# Patient Record
Sex: FEMALE | Race: WHITE | NOT HISPANIC OR LATINO | URBAN - METROPOLITAN AREA
[De-identification: names, ages, dates, MRNs, and addresses within clinical notes are randomized per-mention and may not be internally consistent; named-entity substitution may affect disease eponyms.]

---

## 2017-01-06 ENCOUNTER — COMMUNICATION - HEALTHEAST (OUTPATIENT)
Dept: FAMILY MEDICINE | Facility: CLINIC | Age: 47
End: 2017-01-06

## 2017-01-06 DIAGNOSIS — F31.81 BIPOLAR 2 DISORDER (H): ICD-10-CM

## 2017-01-09 ENCOUNTER — COMMUNICATION - HEALTHEAST (OUTPATIENT)
Dept: FAMILY MEDICINE | Facility: CLINIC | Age: 47
End: 2017-01-09

## 2017-01-10 ENCOUNTER — AMBULATORY - HEALTHEAST (OUTPATIENT)
Dept: LAB | Facility: CLINIC | Age: 47
End: 2017-01-10

## 2017-01-10 ENCOUNTER — COMMUNICATION - HEALTHEAST (OUTPATIENT)
Dept: FAMILY MEDICINE | Facility: CLINIC | Age: 47
End: 2017-01-10

## 2017-01-10 ENCOUNTER — COMMUNICATION - HEALTHEAST (OUTPATIENT)
Dept: NURSING | Facility: CLINIC | Age: 47
End: 2017-01-10

## 2017-01-10 DIAGNOSIS — D68.51 FACTOR V LEIDEN MUTATION (H): ICD-10-CM

## 2017-01-11 ENCOUNTER — COMMUNICATION - HEALTHEAST (OUTPATIENT)
Dept: FAMILY MEDICINE | Facility: CLINIC | Age: 47
End: 2017-01-11

## 2017-01-12 ENCOUNTER — COMMUNICATION - HEALTHEAST (OUTPATIENT)
Dept: FAMILY MEDICINE | Facility: CLINIC | Age: 47
End: 2017-01-12

## 2017-01-12 DIAGNOSIS — D68.51 FACTOR V LEIDEN MUTATION (H): ICD-10-CM

## 2017-01-28 ENCOUNTER — COMMUNICATION - HEALTHEAST (OUTPATIENT)
Dept: FAMILY MEDICINE | Facility: CLINIC | Age: 47
End: 2017-01-28

## 2017-01-28 DIAGNOSIS — F41.0 PANIC ATTACK: ICD-10-CM

## 2017-01-28 DIAGNOSIS — F41.1 GAD (GENERALIZED ANXIETY DISORDER): ICD-10-CM

## 2017-01-30 ENCOUNTER — RECORDS - HEALTHEAST (OUTPATIENT)
Dept: GENERAL RADIOLOGY | Facility: CLINIC | Age: 47
End: 2017-01-30

## 2017-01-30 ENCOUNTER — OFFICE VISIT - HEALTHEAST (OUTPATIENT)
Dept: FAMILY MEDICINE | Facility: CLINIC | Age: 47
End: 2017-01-30

## 2017-01-30 ENCOUNTER — COMMUNICATION - HEALTHEAST (OUTPATIENT)
Dept: INTERNAL MEDICINE | Facility: CLINIC | Age: 47
End: 2017-01-30

## 2017-01-30 DIAGNOSIS — M54.50 LOW BACK PAIN: ICD-10-CM

## 2017-01-30 DIAGNOSIS — E78.5 HYPERLIPIDEMIA: ICD-10-CM

## 2017-01-30 DIAGNOSIS — M53.3 SACROCOCCYGEAL DISORDERS, NOT ELSEWHERE CLASSIFIED: ICD-10-CM

## 2017-01-30 DIAGNOSIS — D68.51 FACTOR V LEIDEN MUTATION (H): ICD-10-CM

## 2017-01-30 DIAGNOSIS — Z72.0 NICOTINE ABUSE: ICD-10-CM

## 2017-01-30 DIAGNOSIS — F41.1 GAD (GENERALIZED ANXIETY DISORDER): ICD-10-CM

## 2017-01-30 DIAGNOSIS — F31.81 BIPOLAR 2 DISORDER (H): ICD-10-CM

## 2017-01-30 DIAGNOSIS — R53.83 FATIGUE: ICD-10-CM

## 2017-01-30 DIAGNOSIS — F41.0 PANIC ATTACK: ICD-10-CM

## 2017-01-30 DIAGNOSIS — M53.3 COCCYXDYNIA: ICD-10-CM

## 2017-01-31 LAB
CHOLEST SERPL-MCNC: 221 MG/DL
FASTING STATUS PATIENT QL REPORTED: NO
HDLC SERPL-MCNC: 38 MG/DL
LDLC SERPL CALC-MCNC: 158 MG/DL
TRIGL SERPL-MCNC: 123 MG/DL

## 2017-02-02 ENCOUNTER — COMMUNICATION - HEALTHEAST (OUTPATIENT)
Dept: FAMILY MEDICINE | Facility: CLINIC | Age: 47
End: 2017-02-02

## 2017-02-02 ENCOUNTER — AMBULATORY - HEALTHEAST (OUTPATIENT)
Dept: NURSING | Facility: CLINIC | Age: 47
End: 2017-02-02

## 2017-02-02 DIAGNOSIS — D68.51 FACTOR V LEIDEN MUTATION (H): ICD-10-CM

## 2017-02-02 DIAGNOSIS — Z79.01 LONG TERM (CURRENT) USE OF ANTICOAGULANTS: ICD-10-CM

## 2017-02-02 DIAGNOSIS — Z79.899 CONTROLLED SUBSTANCE AGREEMENT SIGNED: ICD-10-CM

## 2017-02-06 ENCOUNTER — COMMUNICATION - HEALTHEAST (OUTPATIENT)
Dept: FAMILY MEDICINE | Facility: CLINIC | Age: 47
End: 2017-02-06

## 2017-02-13 ENCOUNTER — OFFICE VISIT - HEALTHEAST (OUTPATIENT)
Dept: FAMILY MEDICINE | Facility: CLINIC | Age: 47
End: 2017-02-13

## 2017-02-13 DIAGNOSIS — B07.0 PLANTAR WART: ICD-10-CM

## 2017-02-13 DIAGNOSIS — B35.3 TINEA PEDIS: ICD-10-CM

## 2017-02-13 ASSESSMENT — MIFFLIN-ST. JEOR: SCORE: 1353.43

## 2017-02-20 ENCOUNTER — COMMUNICATION - HEALTHEAST (OUTPATIENT)
Dept: SCHEDULING | Facility: CLINIC | Age: 47
End: 2017-02-20

## 2017-02-20 DIAGNOSIS — F17.200 NICOTINE DEPENDENCE: ICD-10-CM

## 2017-02-21 ENCOUNTER — COMMUNICATION - HEALTHEAST (OUTPATIENT)
Dept: FAMILY MEDICINE | Facility: CLINIC | Age: 47
End: 2017-02-21

## 2017-02-21 DIAGNOSIS — F41.1 GAD (GENERALIZED ANXIETY DISORDER): ICD-10-CM

## 2017-02-21 DIAGNOSIS — G43.909 MIGRAINE: ICD-10-CM

## 2017-02-25 ENCOUNTER — COMMUNICATION - HEALTHEAST (OUTPATIENT)
Dept: FAMILY MEDICINE | Facility: CLINIC | Age: 47
End: 2017-02-25

## 2017-02-25 DIAGNOSIS — F41.1 GAD (GENERALIZED ANXIETY DISORDER): ICD-10-CM

## 2017-02-25 DIAGNOSIS — F41.0 PANIC ATTACK: ICD-10-CM

## 2017-03-16 ENCOUNTER — COMMUNICATION - HEALTHEAST (OUTPATIENT)
Dept: NURSING | Facility: CLINIC | Age: 47
End: 2017-03-16

## 2017-03-16 ENCOUNTER — AMBULATORY - HEALTHEAST (OUTPATIENT)
Dept: LAB | Facility: CLINIC | Age: 47
End: 2017-03-16

## 2017-03-16 DIAGNOSIS — D68.51 FACTOR V LEIDEN MUTATION (H): ICD-10-CM

## 2017-03-24 ENCOUNTER — COMMUNICATION - HEALTHEAST (OUTPATIENT)
Dept: FAMILY MEDICINE | Facility: CLINIC | Age: 47
End: 2017-03-24

## 2017-03-24 DIAGNOSIS — F41.1 GAD (GENERALIZED ANXIETY DISORDER): ICD-10-CM

## 2017-03-24 DIAGNOSIS — F41.0 PANIC ATTACK: ICD-10-CM

## 2017-03-29 ENCOUNTER — COMMUNICATION - HEALTHEAST (OUTPATIENT)
Dept: INTERNAL MEDICINE | Facility: CLINIC | Age: 47
End: 2017-03-29

## 2017-03-29 ENCOUNTER — AMBULATORY - HEALTHEAST (OUTPATIENT)
Dept: LAB | Facility: CLINIC | Age: 47
End: 2017-03-29

## 2017-03-29 DIAGNOSIS — D68.51 FACTOR V LEIDEN MUTATION (H): ICD-10-CM

## 2017-04-12 ENCOUNTER — COMMUNICATION - HEALTHEAST (OUTPATIENT)
Dept: INTERNAL MEDICINE | Facility: CLINIC | Age: 47
End: 2017-04-12

## 2017-04-12 DIAGNOSIS — D68.51 FACTOR V LEIDEN MUTATION (H): ICD-10-CM

## 2017-04-17 ENCOUNTER — AMBULATORY - HEALTHEAST (OUTPATIENT)
Dept: LAB | Facility: CLINIC | Age: 47
End: 2017-04-17

## 2017-04-17 ENCOUNTER — COMMUNICATION - HEALTHEAST (OUTPATIENT)
Dept: INTERNAL MEDICINE | Facility: CLINIC | Age: 47
End: 2017-04-17

## 2017-04-17 DIAGNOSIS — D68.51 FACTOR V LEIDEN MUTATION (H): ICD-10-CM

## 2017-04-19 ENCOUNTER — COMMUNICATION - HEALTHEAST (OUTPATIENT)
Dept: FAMILY MEDICINE | Facility: CLINIC | Age: 47
End: 2017-04-19

## 2017-04-20 ENCOUNTER — COMMUNICATION - HEALTHEAST (OUTPATIENT)
Dept: FAMILY MEDICINE | Facility: CLINIC | Age: 47
End: 2017-04-20

## 2017-04-20 DIAGNOSIS — F41.0 PANIC ATTACK: ICD-10-CM

## 2017-04-20 DIAGNOSIS — F41.1 GAD (GENERALIZED ANXIETY DISORDER): ICD-10-CM

## 2017-05-02 ENCOUNTER — COMMUNICATION - HEALTHEAST (OUTPATIENT)
Dept: SCHEDULING | Facility: CLINIC | Age: 47
End: 2017-05-02

## 2017-05-03 ENCOUNTER — COMMUNICATION - HEALTHEAST (OUTPATIENT)
Dept: FAMILY MEDICINE | Facility: CLINIC | Age: 47
End: 2017-05-03

## 2017-05-03 ENCOUNTER — COMMUNICATION - HEALTHEAST (OUTPATIENT)
Dept: NURSING | Facility: CLINIC | Age: 47
End: 2017-05-03

## 2017-05-03 DIAGNOSIS — D68.51 FACTOR V LEIDEN MUTATION (H): ICD-10-CM

## 2017-05-03 DIAGNOSIS — F17.200 NICOTINE DEPENDENCE: ICD-10-CM

## 2017-05-03 DIAGNOSIS — F41.1 GAD (GENERALIZED ANXIETY DISORDER): ICD-10-CM

## 2017-05-03 DIAGNOSIS — F31.81 BIPOLAR 2 DISORDER (H): ICD-10-CM

## 2017-05-03 DIAGNOSIS — Z79.01 LONG TERM (CURRENT) USE OF ANTICOAGULANTS: ICD-10-CM

## 2017-05-04 ENCOUNTER — COMMUNICATION - HEALTHEAST (OUTPATIENT)
Dept: INTERNAL MEDICINE | Facility: CLINIC | Age: 47
End: 2017-05-04

## 2017-05-04 ENCOUNTER — AMBULATORY - HEALTHEAST (OUTPATIENT)
Dept: LAB | Facility: CLINIC | Age: 47
End: 2017-05-04

## 2017-05-04 DIAGNOSIS — D68.51 FACTOR V LEIDEN MUTATION (H): ICD-10-CM

## 2017-05-05 ENCOUNTER — COMMUNICATION - HEALTHEAST (OUTPATIENT)
Dept: FAMILY MEDICINE | Facility: CLINIC | Age: 47
End: 2017-05-05

## 2017-05-05 DIAGNOSIS — F31.81 BIPOLAR 2 DISORDER (H): ICD-10-CM

## 2017-05-05 DIAGNOSIS — F17.200 NICOTINE DEPENDENCE: ICD-10-CM

## 2017-05-05 DIAGNOSIS — D68.51 FACTOR V LEIDEN MUTATION (H): ICD-10-CM

## 2017-05-05 DIAGNOSIS — Z79.01 LONG TERM (CURRENT) USE OF ANTICOAGULANTS: ICD-10-CM

## 2017-05-08 ENCOUNTER — RECORDS - HEALTHEAST (OUTPATIENT)
Dept: ADMINISTRATIVE | Facility: OTHER | Age: 47
End: 2017-05-08

## 2017-05-08 ENCOUNTER — COMMUNICATION - HEALTHEAST (OUTPATIENT)
Dept: FAMILY MEDICINE | Facility: CLINIC | Age: 47
End: 2017-05-08

## 2017-05-08 DIAGNOSIS — Z79.01 LONG TERM (CURRENT) USE OF ANTICOAGULANTS: ICD-10-CM

## 2017-05-08 DIAGNOSIS — D68.51 FACTOR V LEIDEN MUTATION (H): ICD-10-CM

## 2017-05-09 ENCOUNTER — AMBULATORY - HEALTHEAST (OUTPATIENT)
Dept: LAB | Facility: CLINIC | Age: 47
End: 2017-05-09

## 2017-05-09 ENCOUNTER — COMMUNICATION - HEALTHEAST (OUTPATIENT)
Dept: INTERNAL MEDICINE | Facility: CLINIC | Age: 47
End: 2017-05-09

## 2017-05-09 ENCOUNTER — COMMUNICATION - HEALTHEAST (OUTPATIENT)
Dept: FAMILY MEDICINE | Facility: CLINIC | Age: 47
End: 2017-05-09

## 2017-05-09 DIAGNOSIS — D68.51 FACTOR V LEIDEN MUTATION (H): ICD-10-CM

## 2017-05-09 DIAGNOSIS — Z79.01 LONG TERM (CURRENT) USE OF ANTICOAGULANTS: ICD-10-CM

## 2017-05-11 ENCOUNTER — RECORDS - HEALTHEAST (OUTPATIENT)
Dept: ADMINISTRATIVE | Facility: OTHER | Age: 47
End: 2017-05-11

## 2017-05-19 ENCOUNTER — AMBULATORY - HEALTHEAST (OUTPATIENT)
Dept: LAB | Facility: CLINIC | Age: 47
End: 2017-05-19

## 2017-05-19 ENCOUNTER — COMMUNICATION - HEALTHEAST (OUTPATIENT)
Dept: FAMILY MEDICINE | Facility: CLINIC | Age: 47
End: 2017-05-19

## 2017-05-19 ENCOUNTER — COMMUNICATION - HEALTHEAST (OUTPATIENT)
Dept: INTERNAL MEDICINE | Facility: CLINIC | Age: 47
End: 2017-05-19

## 2017-05-19 DIAGNOSIS — D68.51 FACTOR V LEIDEN MUTATION (H): ICD-10-CM

## 2017-05-21 ENCOUNTER — COMMUNICATION - HEALTHEAST (OUTPATIENT)
Dept: FAMILY MEDICINE | Facility: CLINIC | Age: 47
End: 2017-05-21

## 2017-05-21 DIAGNOSIS — F41.1 GAD (GENERALIZED ANXIETY DISORDER): ICD-10-CM

## 2017-05-21 DIAGNOSIS — F41.0 PANIC ATTACK: ICD-10-CM

## 2017-05-23 ENCOUNTER — COMMUNICATION - HEALTHEAST (OUTPATIENT)
Dept: INTERNAL MEDICINE | Facility: CLINIC | Age: 47
End: 2017-05-23

## 2017-05-23 ENCOUNTER — AMBULATORY - HEALTHEAST (OUTPATIENT)
Dept: LAB | Facility: CLINIC | Age: 47
End: 2017-05-23

## 2017-05-23 DIAGNOSIS — D68.51 FACTOR V LEIDEN MUTATION (H): ICD-10-CM

## 2017-05-27 ENCOUNTER — COMMUNICATION - HEALTHEAST (OUTPATIENT)
Dept: FAMILY MEDICINE | Facility: CLINIC | Age: 47
End: 2017-05-27

## 2017-05-27 DIAGNOSIS — F31.81 BIPOLAR 2 DISORDER (H): ICD-10-CM

## 2017-06-07 ENCOUNTER — OFFICE VISIT - HEALTHEAST (OUTPATIENT)
Dept: FAMILY MEDICINE | Facility: CLINIC | Age: 47
End: 2017-06-07

## 2017-06-07 DIAGNOSIS — J40 BRONCHITIS: ICD-10-CM

## 2017-06-07 ASSESSMENT — MIFFLIN-ST. JEOR: SCORE: 1321.68

## 2017-06-13 ENCOUNTER — COMMUNICATION - HEALTHEAST (OUTPATIENT)
Dept: INTERNAL MEDICINE | Facility: CLINIC | Age: 47
End: 2017-06-13

## 2017-06-13 ENCOUNTER — OFFICE VISIT - HEALTHEAST (OUTPATIENT)
Dept: FAMILY MEDICINE | Facility: CLINIC | Age: 47
End: 2017-06-13

## 2017-06-13 ENCOUNTER — RECORDS - HEALTHEAST (OUTPATIENT)
Dept: ADMINISTRATIVE | Facility: OTHER | Age: 47
End: 2017-06-13

## 2017-06-13 DIAGNOSIS — F32.9 MAJOR DEPRESSION, CHRONIC: ICD-10-CM

## 2017-06-13 DIAGNOSIS — K02.9 TOOTH DECAY: ICD-10-CM

## 2017-06-13 DIAGNOSIS — D68.51 FACTOR V LEIDEN MUTATION (H): ICD-10-CM

## 2017-06-13 DIAGNOSIS — Z00.00 ANNUAL PHYSICAL EXAM: ICD-10-CM

## 2017-06-13 DIAGNOSIS — F31.81 BIPOLAR 2 DISORDER (H): ICD-10-CM

## 2017-06-13 DIAGNOSIS — E78.5 HYPERLIPIDEMIA, UNSPECIFIED HYPERLIPIDEMIA TYPE: ICD-10-CM

## 2017-06-13 DIAGNOSIS — Z79.01 ANTICOAGULANT LONG-TERM USE: ICD-10-CM

## 2017-06-13 DIAGNOSIS — E55.9 VITAMIN D DEFICIENCY: ICD-10-CM

## 2017-06-13 LAB
CHOLEST SERPL-MCNC: 148 MG/DL
FASTING STATUS PATIENT QL REPORTED: ABNORMAL
HDLC SERPL-MCNC: 40 MG/DL
LDLC SERPL CALC-MCNC: 93 MG/DL
TRIGL SERPL-MCNC: 73 MG/DL

## 2017-06-13 ASSESSMENT — MIFFLIN-ST. JEOR: SCORE: 1328.49

## 2017-06-15 ENCOUNTER — COMMUNICATION - HEALTHEAST (OUTPATIENT)
Dept: FAMILY MEDICINE | Facility: CLINIC | Age: 47
End: 2017-06-15

## 2017-06-19 ENCOUNTER — COMMUNICATION - HEALTHEAST (OUTPATIENT)
Dept: FAMILY MEDICINE | Facility: CLINIC | Age: 47
End: 2017-06-19

## 2017-06-20 ENCOUNTER — COMMUNICATION - HEALTHEAST (OUTPATIENT)
Dept: FAMILY MEDICINE | Facility: CLINIC | Age: 47
End: 2017-06-20

## 2017-06-20 DIAGNOSIS — D68.51 FACTOR V LEIDEN MUTATION (H): ICD-10-CM

## 2017-06-20 DIAGNOSIS — F41.0 PANIC ATTACK: ICD-10-CM

## 2017-06-20 DIAGNOSIS — F41.1 GAD (GENERALIZED ANXIETY DISORDER): ICD-10-CM

## 2017-06-22 ENCOUNTER — AMBULATORY - HEALTHEAST (OUTPATIENT)
Dept: LAB | Facility: CLINIC | Age: 47
End: 2017-06-22

## 2017-06-22 ENCOUNTER — COMMUNICATION - HEALTHEAST (OUTPATIENT)
Dept: INTERNAL MEDICINE | Facility: CLINIC | Age: 47
End: 2017-06-22

## 2017-06-22 DIAGNOSIS — D68.51 FACTOR V LEIDEN MUTATION (H): ICD-10-CM

## 2017-06-24 ENCOUNTER — COMMUNICATION - HEALTHEAST (OUTPATIENT)
Dept: FAMILY MEDICINE | Facility: CLINIC | Age: 47
End: 2017-06-24

## 2017-06-24 DIAGNOSIS — G43.909 MIGRAINE: ICD-10-CM

## 2017-06-24 DIAGNOSIS — F41.1 GAD (GENERALIZED ANXIETY DISORDER): ICD-10-CM

## 2017-06-26 ENCOUNTER — COMMUNICATION - HEALTHEAST (OUTPATIENT)
Dept: FAMILY MEDICINE | Facility: CLINIC | Age: 47
End: 2017-06-26

## 2017-06-26 DIAGNOSIS — Z79.01 LONG TERM (CURRENT) USE OF ANTICOAGULANTS: ICD-10-CM

## 2017-06-26 DIAGNOSIS — D68.51 FACTOR V LEIDEN MUTATION (H): ICD-10-CM

## 2017-06-27 ENCOUNTER — OFFICE VISIT - HEALTHEAST (OUTPATIENT)
Dept: FAMILY MEDICINE | Facility: CLINIC | Age: 47
End: 2017-06-27

## 2017-06-27 ENCOUNTER — COMMUNICATION - HEALTHEAST (OUTPATIENT)
Dept: NURSING | Facility: CLINIC | Age: 47
End: 2017-06-27

## 2017-06-27 DIAGNOSIS — Z79.01 ANTICOAGULANT LONG-TERM USE: ICD-10-CM

## 2017-06-27 DIAGNOSIS — D68.51 FACTOR V LEIDEN MUTATION (H): ICD-10-CM

## 2017-06-27 DIAGNOSIS — R05.9 COUGH: ICD-10-CM

## 2017-06-27 DIAGNOSIS — B35.3 ATHLETE'S FOOT: ICD-10-CM

## 2017-06-27 DIAGNOSIS — J20.8 VIRAL BRONCHITIS: ICD-10-CM

## 2017-06-30 ENCOUNTER — COMMUNICATION - HEALTHEAST (OUTPATIENT)
Dept: INTERNAL MEDICINE | Facility: CLINIC | Age: 47
End: 2017-06-30

## 2017-06-30 ENCOUNTER — AMBULATORY - HEALTHEAST (OUTPATIENT)
Dept: LAB | Facility: CLINIC | Age: 47
End: 2017-06-30

## 2017-06-30 DIAGNOSIS — D68.51 FACTOR V LEIDEN MUTATION (H): ICD-10-CM

## 2017-07-17 ENCOUNTER — COMMUNICATION - HEALTHEAST (OUTPATIENT)
Dept: FAMILY MEDICINE | Facility: CLINIC | Age: 47
End: 2017-07-17

## 2017-07-17 DIAGNOSIS — F41.0 PANIC ATTACK: ICD-10-CM

## 2017-07-17 DIAGNOSIS — F41.1 GAD (GENERALIZED ANXIETY DISORDER): ICD-10-CM

## 2017-07-21 ENCOUNTER — COMMUNICATION - HEALTHEAST (OUTPATIENT)
Dept: INTERNAL MEDICINE | Facility: CLINIC | Age: 47
End: 2017-07-21

## 2017-07-21 DIAGNOSIS — D68.51 FACTOR V LEIDEN MUTATION (H): ICD-10-CM

## 2017-08-22 ENCOUNTER — COMMUNICATION - HEALTHEAST (OUTPATIENT)
Dept: FAMILY MEDICINE | Facility: CLINIC | Age: 47
End: 2017-08-22

## 2017-08-22 DIAGNOSIS — F31.81 BIPOLAR 2 DISORDER (H): ICD-10-CM

## 2017-09-21 ENCOUNTER — COMMUNICATION - HEALTHEAST (OUTPATIENT)
Dept: FAMILY MEDICINE | Facility: CLINIC | Age: 47
End: 2017-09-21

## 2018-09-17 ENCOUNTER — COMMUNICATION - HEALTHEAST (OUTPATIENT)
Dept: FAMILY MEDICINE | Facility: CLINIC | Age: 48
End: 2018-09-17

## 2018-12-03 ENCOUNTER — COMMUNICATION - HEALTHEAST (OUTPATIENT)
Dept: FAMILY MEDICINE | Facility: CLINIC | Age: 48
End: 2018-12-03

## 2018-12-03 DIAGNOSIS — F41.1 GAD (GENERALIZED ANXIETY DISORDER): ICD-10-CM

## 2018-12-03 DIAGNOSIS — G43.909 MIGRAINE: ICD-10-CM

## 2019-05-24 ENCOUNTER — COMMUNICATION - HEALTHEAST (OUTPATIENT)
Dept: FAMILY MEDICINE | Facility: CLINIC | Age: 49
End: 2019-05-24

## 2019-05-24 DIAGNOSIS — F41.1 GAD (GENERALIZED ANXIETY DISORDER): ICD-10-CM

## 2019-05-24 DIAGNOSIS — G43.909 MIGRAINE: ICD-10-CM

## 2021-05-28 ENCOUNTER — RECORDS - HEALTHEAST (OUTPATIENT)
Dept: ADMINISTRATIVE | Facility: CLINIC | Age: 51
End: 2021-05-28

## 2021-05-29 NOTE — TELEPHONE ENCOUNTER
lvmtcb - no refill until seen. Does she see Dr. Larson or another provider, please clarify. If she is still wanting to see Dr. Larson we need a med check appt  Please hlep her make an appt.

## 2021-05-29 NOTE — TELEPHONE ENCOUNTER
RN cannot approve Refill Request    RN can NOT refill this medication PCP messaged that patient is overdue for Office Visit, and BP. Last office visit: 2/13/2017 Benjy Bobby MD Last Physical: Visit date not found Last MTM visit: Visit date not found Last visit same specialty: 6/27/2017 Fannie Larson MD.  Next visit within 3 mo: Visit date not found  Next physical within 3 mo: Visit date not found      Ivan Mendoza, [facility] Triage/Med Refill 5/25/2019    Requested Prescriptions   Pending Prescriptions Disp Refills     propranolol (INDERAL) 10 MG tablet [Pharmacy Med Name: PROPRANOLOL 10MG TABLETS] 270 tablet 0     Sig: TAKE 1 TABLET(10 MG) BY MOUTH THREE TIMES DAILY FOR MIGRAINE OR ANXIETY PREVENTION       Beta-Blockers Refill Protocol Failed - 5/24/2019  2:18 PM        Failed - PCP or prescribing provider visit in past 12 months or next 3 months     Last office visit with prescriber/PCP: 2/13/2017 Benjy Bobby MD OR same dept: Visit date not found OR same specialty: 6/27/2017 Fannie Larson MD  Last physical: Visit date not found Last MTM visit: Visit date not found   Next visit within 3 mo: Visit date not found  Next physical within 3 mo: Visit date not found  Prescriber OR PCP: Benjy Bobby MD  Last diagnosis associated with med order: 1. Migraine  - propranolol (INDERAL) 10 MG tablet [Pharmacy Med Name: PROPRANOLOL 10MG TABLETS]; TAKE 1 TABLET(10 MG) BY MOUTH THREE TIMES DAILY FOR MIGRAINE OR ANXIETY PREVENTION  Dispense: 270 tablet; Refill: 0    2. SUKUMAR (generalized anxiety disorder)  - propranolol (INDERAL) 10 MG tablet [Pharmacy Med Name: PROPRANOLOL 10MG TABLETS]; TAKE 1 TABLET(10 MG) BY MOUTH THREE TIMES DAILY FOR MIGRAINE OR ANXIETY PREVENTION  Dispense: 270 tablet; Refill: 0    If protocol passes may refill for 12 months if within 3 months of last provider visit (or a total of 15 months).             Failed - Blood pressure filed in past 12 months     BP  Readings from Last 1 Encounters:   06/27/17 100/60

## 2021-05-30 VITALS — HEIGHT: 66 IN | BODY MASS INDEX: 25.39 KG/M2 | WEIGHT: 158 LBS

## 2021-05-30 VITALS — BODY MASS INDEX: 25.21 KG/M2 | WEIGHT: 156.2 LBS

## 2021-05-31 VITALS — HEIGHT: 66 IN | BODY MASS INDEX: 24.51 KG/M2 | WEIGHT: 152.5 LBS

## 2021-05-31 VITALS — WEIGHT: 148 LBS | BODY MASS INDEX: 23.89 KG/M2

## 2021-05-31 VITALS — WEIGHT: 151 LBS | HEIGHT: 66 IN | BODY MASS INDEX: 24.27 KG/M2

## 2021-06-08 NOTE — PROGRESS NOTES
SUBJECTIVE: Ofelia Cisneros is a 46 y.o.  female who presents today for evaluation after being seen in the ER on last Friday with a fall.  She had fallen on her bottom and was in a lot of pain.  In the ER they noted that she had some slurred speech and so a CT of the head was done which was normal.  Other than giving her Zofran and oxycodone, nothing else was done and she was sent home with a diagnosis of tailbone injury.  No x-rays were ever done.  Since that time her pain has continued and in ways seem worse.  She cannot sit for long periods of time.  In fact during our visit she needs to stand secondary to the pain.  Then she will sit down again.  She has history of back pain which is also exacerbated by this fall.  She has bipolar disorder, anxiety and intermittent depression.  She is currently on Depakote, Lexapro and Latuda.  For her anxiety she has Klonopin 1 mg tabs which I have weaned her down to only 1 tablet in the morning, half a tablet midday, and 1 tablet in the evening.  She has been resistant to a further decrease.  She also has been given Ativan for panic attacks.  I have a contract with her for these controlled substances.  I have weaned her from 30 lorazepam down to 20 and today we discussed going down to 15 per 30 days.  She also takes Adderall XR 15 mg every morning and 15 mg non-extended release at night for narcolepsy.  These are prescribed to her by Dr. Nguyen who is her sleep doctor.  These controlled substances are medications she came to me on.  I have tried to get her to establish with a psychiatrist but have not had luck with her staying with anyone.  We fill out a new treatment agreement today.  She was given a PHQ 9 and scored a 13 today which I believe is an improvement from prior.  She also scores a 13 on her SUKUMAR 7.  We discussed that her Latuda is actually at a subtherapeutic level and that the usual doses between 40 and 120.  Since she is tolerating the Latuda she is  willing to go up to 40 today.  She tells me she quit smoking 2 days ago.  She smokes because she is anxious.  This does not go well with the fact that she has factor V Leiden and it needs to be coagulated.  We discussed the importance of quitting permanently.  She needs an INR checked today.    OBJECTIVE:   Visit Vitals     BP 90/50     Pulse 84     Wt 156 lb 3.2 oz (70.9 kg)     BMI 25.21 kg/m2     General: Overweight middle-aged female in no acute distress other than she cannot sit still and tends to sit on her right buttock for short amounts of time and then needs to stand  Heart: Regular rate and rhythm without murmur  Lungs: Clear bilaterally  Abdomen: Soft, nontender  Extremities: Warm, dry and without edema    I have counseled the patient for tobacco cessation and the follow up will occur  at the next visit.      ASSESSMENT & PLAN:    1. Bipolar 2 disorder  Comprehensive Metabolic Panel    lurasidone (LATUDA) 40 mg Tab tablet    Valproic Acid, Free (Depakene , Free)   2. SUKUMAR (generalized anxiety disorder)  clonazePAM (KLONOPIN) 1 MG tablet   3. Factor V Leiden mutation  INR    INR   4. Nicotine abuse     5. Hyperlipidemia  Lipid Cascade RANDOM    simvastatin (ZOCOR) 20 MG tablet   6. Panic attack  LORazepam (ATIVAN) 1 MG tablet   7. Fatigue  Thyroid Stimulating Hormone (TSH)   8. Coccyxdynia  XR Pelvis AP   9. Low back pain  XR Lumbar Spine 2 or 3 VWS     I will check the above-mentioned lab work and get back to her by mail and only call with the grossly abnormal values.  I am going to increase her Latuda dose from 20-40 mg daily.  We signed a new contract for her controlled substance use.  These meds were refilled today.  She will have her INR checked and the Coumadin nursing will call her with directions.  I did x-rays of her pelvis and lumbar spine as she is obviously very symptomatic still.  I will call her with those results as soon as they come in.  She is to see me back in no longer than 6 months time  and preferably more like 4 months.  40 minutes spent together with the patient today, more than 50% spent in counseling, discussing the above topics.          Patient Active Problem List   Diagnosis     Factor V Leiden mutation     Migraine     Major depression, chronic     Fibromyalgia     SUKUMAR (generalized anxiety disorder)     Narcolepsy     Bipolar 2 disorder     Vitamin D deficiency     Controlled substance agreement signed     Anticoagulant long-term use     Panic attack     Low back pain     Nicotine abuse     Menorrhagia     Suicidal ideation     neuroleptic consent discussed and signed 8/25/16     Periodic limb movement disorder     Coccyxdynia     Hyperlipidemia       Current Outpatient Prescriptions on File Prior to Visit   Medication Sig Dispense Refill     dextroamphetamine-amphetamine (ADDERALL XR) 15 MG 24 hr capsule Take 1 capsule (15 mg total) by mouth every morning. Takes daily ~ 1100 30 capsule 0     dextroamphetamine-amphetamine 15 mg Tab Take 15 mg by mouth every morning.        divalproex (DEPAKOTE) 500 MG 24 hr tablet TAKE 1 TABLET BY MOUTH TWICE DAILY 60 tablet 5     escitalopram oxalate (LEXAPRO) 20 MG tablet TAKE 1 TABLET(20 MG) BY MOUTH DAILY 30 tablet 5     propranolol (INDERAL) 10 MG tablet TAKE 1 TABLET(10 MG) BY MOUTH THREE TIMES DAILY FOR MIGRAINE OR ANXIETY PREVENTION 270 tablet 0     No current facility-administered medications on file prior to visit.

## 2021-06-08 NOTE — PROGRESS NOTES
Assessment:  #1.  Tinea pedis.  #2.  Multiple small corns versus plantar warts on the left foot near the metatarsal head area.    Plan: Did shave the 6.  Small corns or warts with the #15 blade.  Recommend she use miconazole foot powder on regular basis every morning and every afternoon and then use the 1% terbinafine cream applied at bedtime only to the area of the tinea involvement.  Explained this is a gradual process to improve.  Recommend she use the foot powder for drying effect on a daily basis long-term.  Follow-up as needed.  Slip written for no work yesterday because of the foot pain.  It is okay for her to return to work this Thursday when she is next scheduled on .  She only works part-time.    Subjective: 26-year-old female presenting for evaluation of bilateral foot problem.  She has some small lumps on the left foot that were hurting enough that she did not go to work yesterday.  She has a lot of cracking and drying and scaling.  Then noticed some small purplish spots on the bottom of the feet.  Past Medical History:   Diagnosis Date     Anxiety      Bipolar 1 disorder      Depression      DVT (deep venous thrombosis), right      Factor V Leiden      Fibromyalgia      Narcolepsy       (normal spontaneous vaginal delivery)      PE (pulmonary thromboembolism)      Allergies   Allergen Reactions     Adhesive Tape-Silicones Rash     EKG patches     Morphine Nausea Only     Ok if she has ondansetron with it     Percocet [Oxycodone-Acetaminophen] Nausea Only     Ok if she has ondansetron with it     Risperidone Nausea Only and Rash     See list of her medications before the terbinafine cream was added today  Current Outpatient Prescriptions   Medication Sig Dispense Refill     clonazePAM (KLONOPIN) 1 MG tablet TAKE ONE TABLET BY MOUTH EVERY MORNING, ONE-HALF TABLET MIDDAY, AND ONE TABLET EVERY EVENING 75 tablet 0     dextroamphetamine-amphetamine (ADDERALL XR) 15 MG 24 hr capsule Take 1  "capsule (15 mg total) by mouth every morning. Takes daily ~ 1100 30 capsule 0     dextroamphetamine-amphetamine 15 mg Tab Take 15 mg by mouth every morning.        divalproex (DEPAKOTE) 500 MG 24 hr tablet TAKE 1 TABLET BY MOUTH TWICE DAILY 60 tablet 5     escitalopram oxalate (LEXAPRO) 20 MG tablet TAKE 1 TABLET(20 MG) BY MOUTH DAILY 30 tablet 5     LORazepam (ATIVAN) 1 MG tablet TAKE ONE-HALF TABLET BY MOUTH ONCE DAILY AS NEEDED FOR PAIN ATTACKS, MAY REPEAT ONCE AFTER 20 MINUTES AS DIRECTED 15 tablet 0     lurasidone (LATUDA) 40 mg Tab tablet Take 1 tablet (40 mg total) by mouth daily. 180 tablet 0     propranolol (INDERAL) 10 MG tablet TAKE 1 TABLET(10 MG) BY MOUTH THREE TIMES DAILY FOR MIGRAINE OR ANXIETY PREVENTION 270 tablet 0     warfarin (COUMADIN) 2 MG tablet Take 1 or 2 tablets  daily by mouth, as directed.  Dose adjusted based on INR results. 180 tablet 0     simvastatin (ZOCOR) 20 MG tablet Take 1 tablet (20 mg total) by mouth bedtime. 30 tablet 5     terbinafine HCl (LAMISIL) 1 % cream Apply each night at bedtime to bottom of feet 30 g 2     No current facility-administered medications for this visit.      Objective:  Visit Vitals     /58     Pulse 62     Resp 14     Ht 5' 6\" (1.676 m)     Wt 158 lb (71.7 kg)     Breastfeeding No     BMI 25.5 kg/m2     Bilateral foot exam shows significant tinea pedis in moccasin type distribution and then there are numerous areas of small subdermal vesicles and bruises present.  Near the third metatarsal head on the left foot there are 6 small punctate lumps.  Ddi shave them down with a #15 blade.  They are less than 2 mm diameter and unable to tell if they are warts or little corns but due to the number of them, probably multiple small warts.  "

## 2021-06-11 NOTE — PROGRESS NOTES
PROGRESS NOTE   6/7/2017    SUBJECTIVE:  Ofelia Cisneros is a 46 y.o. female  who presents for   Chief Complaint   Patient presents with     Cough     Check productive cough, hard to catch her breath worse at night, sinus drainage headache pressure x 3 weeks     Patient comes in today because she has not felt well for about the last 3 weeks or so.  She notes that originally she had some typical cold symptoms.  She was in [place] and so she thought she got cold without the cold weather and being outside as much as she was but her cold symptoms just have not resolved.  Initially she had a runny nose and a stuffy nose and head congestion and a cough.  For the past couple of days now it seems that the cough is getting much deeper and is keeping her up at night.  She also notes that is hard for her to breathe at night because of the cough.  She has been coughing up some phlegm as well.  She is able to function and do what she needs to do on a daily basis but is feeling like this cough is getting worse instead of better and therefore comes in for evaluation.  She has not been running any fevers.  She has been eating and drinking without any problems.  She has previously been on amoxicillin the last couple of months prior to her dental procedures but that really did not seem to make any difference in terms of her cold type symptoms.  She does note that she is on Coumadin and wanted me to be aware of that if she does get started on the medication at this time.    Patient Active Problem List   Diagnosis     Factor V Leiden mutation     Migraine     Major depression, chronic     Fibromyalgia     SUKUMAR (generalized anxiety disorder)     Narcolepsy     Bipolar 2 disorder     Vitamin D deficiency     Controlled substance agreement signed     Anticoagulant long-term use     Panic attack     Low back pain     Nicotine abuse     Menorrhagia     Suicidal ideation     neuroleptic consent discussed and signed 8/25/16     [facility] limb  movement disorder     Coccyxdynia     Hyperlipidemia       Current Outpatient Prescriptions   Medication Sig Dispense Refill     clonazePAM (KLONOPIN) 1 MG tablet TAKE 1 TABLET BY MOUTH EVERY MORNING, 1/2 TABLET MIDDAY, AND 1 TABLET EVERY EVENING 75 tablet 0     dextroamphetamine-amphetamine (ADDERALL XR) 15 MG 24 hr capsule Take 1 capsule (15 mg total) by mouth every morning. Takes daily ~ 1100 30 capsule 0     dextroamphetamine-amphetamine 15 mg Tab Take 15 mg by mouth every morning.        divalproex (DEPAKOTE) 500 MG 24 hr tablet TAKE 1 TABLET BY MOUTH TWICE DAILY 60 tablet 1     escitalopram oxalate (LEXAPRO) 20 MG tablet TAKE 1 TABLET(20 MG) BY MOUTH DAILY 90 tablet 0     LORazepam (ATIVAN) 1 MG tablet TAKE ONE-HALF TABLET BY MOUTH ONCE DAILY AS NEEDED FOR PANIC ATTACKS, MAY REPEAT ONCE AFTER 20 MINUTES. 15 tablet 0     lurasidone (LATUDA) 40 mg Tab tablet Take 1 tablet (40 mg total) by mouth daily. 180 tablet 0     propranolol (INDERAL) 10 MG tablet TAKE 1 TABLET(10 MG) BY MOUTH THREE TIMES DAILY FOR MIGRAINE OR ANXIETY PREVENTION 270 tablet 0     simvastatin (ZOCOR) 20 MG tablet Take 1 tablet (20 mg total) by mouth bedtime. 30 tablet 5     terbinafine HCl (LAMISIL) 1 % cream Apply each night at bedtime to bottom of feet 30 g 2     varenicline (CHANTIX CONTINUING MONTH KENDY) 1 mg tablet Take 1 tablet (1 mg total) by mouth 2 (two) times a day. Take with full glass of water. 60 tablet 2     warfarin (COUMADIN) 2 MG tablet Take one to two tablets (2 - 4 mg) by mouth daily, or as directed. Adjust dose based on INR result. 180 tablet 0     azithromycin (ZITHROMAX Z-KENDY) 250 MG tablet Take 2 tablets (500 mg) on  Day 1,  followed by 1 tablet (250 mg) once daily on Days 2 through 5. 6 tablet 0     No current facility-administered medications for this visit.        Allergies   Allergen Reactions     Adhesive Tape-Silicones Rash     EKG patches     Morphine Nausea Only     Ok if she has ondansetron with it     Percocet  "[Oxycodone-Acetaminophen] Nausea Only     Ok if she has ondansetron with it     Risperidone Nausea Only and Rash       Past Medical History:   Diagnosis Date     Anxiety      Bipolar 1 disorder      Depression      DVT (deep venous thrombosis), right      Factor V Leiden      Fibromyalgia      Narcolepsy       (normal spontaneous vaginal delivery)      PE (pulmonary thromboembolism)        Past Surgical History:   Procedure Laterality Date     APPENDECTOMY       HYSTERECTOMY  2016     LUMBAR FUSION         History   Smoking Status     Current Every Day Smoker     Packs/day: 0.25     Types: Cigarettes     Last attempt to quit: 2017   Smokeless Tobacco     Never Used     Comment: 3 cigs per day       OBJECTIVE:     /64 (Patient Site: Left Arm, Patient Position: Sitting, Cuff Size: Adult Regular)  Pulse 74 Comment: regular  Temp 98.1  F (36.7  C) (Oral)   Resp 14 Comment: regular  Ht 5' 6\" (1.676 m)  Wt 151 lb (68.5 kg)  BMI 24.37 kg/m2    Physical Exam:  GENERAL APPEARANCE: A&A, NAD, well hydrated, well nourished  SKIN:  Normal skin turgor, no lesions/rashes   HEENT: moist mucous membranes, no rhinorrhea, PERRLA, TM's clear bilaterally, Throat without significant erythema or exudate.  NECK: Supple, full ROM, no significant lymphadenopathy or thyromegaly  CV: RRR, no M/G/R   LUNGS: CTAB  EXTREMITY: no edema   NEURO: no gross deficits   PSYCHIATRIC;  Mood appropriate, memory intact      ASSESSMENT/PLAN:     Bronchitis [J40]      1. Bronchitis  - azithromycin (ZITHROMAX Z-KENDY) 250 MG tablet; Take 2 tablets (500 mg) on  Day 1,  followed by 1 tablet (250 mg) once daily on Days 2 through 5.  Dispense: 6 tablet; Refill: 0    Patient overall seems to be doing okay.  She does have a bronchitis based on exam today.  I am going to go ahead and start her on some Zithromax today.  We did discuss the fact that she should have gradual improvement in her symptoms.  If she does not have gradual improvement " in her symptoms she certainly should let us know.  I do not see any evidence for pneumonia based on her exam today and she was quite reassured by that.  [place].  She also is wondering about continuing her medications until she finds a physician up there.  We talked about the fact that she should move up there and certainly just prior to moving up there she can get her medications refilled by Dr. Larson and then hopefully she will have found a new physician by the time she needs a refill again.  We certainly can give her a small amount of medication to get her through until she is established with the physician in [facility] where she is moving to.  All of her questions were answered today.  If she has additional questions or concerns will certainly let me know.  If she does not seem to have gradual improvement in her symptoms she should let us know.  She did request refills of her medications however has a physical exam appointment next week with Dr. Larson and we discussed the fact that it would be more appropriate for her to get the refills at that time since they are chronic medications for her.  She certainly can discuss her move to [facility] further with Dr. Larson and establish a plan in terms of making sure that she stays on her medications.  Ania Alaniz MD

## 2021-06-11 NOTE — PROGRESS NOTES
Assessment:      Healthy female exam.    1. Annual physical exam  Basic Metabolic Panel   2. Hyperlipidemia, unspecified hyperlipidemia type  Lipid Cascade FASTING   3. Vitamin D deficiency  Vitamin D, Total (25-Hydroxy)   4. Bipolar 2 disorder  Valproic Acid, Free (Depakene , Free)   5. Major depression, chronic     6. Anticoagulant long-term use     7. Factor V Leiden mutation  INR   8. Tooth decay          Plan:       Blood tests: See the above-mentioned lab work.  I will get back to her by mail and only call with grossly abnormal values.  The anticoagulation team will call her with directions concerning her reinitiation of Coumadin the day after her dental procedures.  I wrote a note for her that okays her to have the procedures done.  Contraception: none.  Discussed healthy lifestyle modifications.  Follow up: Return in about 6 months (around 12/13/2017) for 6 month med check.   Or if she is planning to leave town, she can see me before that time to get some records.    Subjective:      Ofelia Cisneros is a 46 y.o. female who presents for an annual exam. The patient is not currently sexually active. The patient participates in regular exercise: yes. The patient reports that there is not domestic violence in her life.  Patient has a history of bipolar and depression.  She has been on Latuda and Depakote and has been quite stable since that time.  She has not been in the hospital for a long time.  She says she feels better than she ever does for her PHQ 9 and SUKUMAR 7 questions.  She scored a 14 on each of these and she notes that they are usually in the 20s.  She tells me she is planning to move to [facility] where she has family.  She is currently living with her mom and now has the opportunity to rent a mobile home that has more space for her and her son.  She has some chronic pain and we have a controlled agreement on file.  She also has narcolepsy and is treated through the sleep clinic and is given  Adderall.  She is anticoagulated for factor V Leiden.  She continues to smoke but not a lot and we discussed quitting.  She has hyperlipidemia and is currently on 20 mg of simvastatin.  Her last mammogram was in 2015 and we discussed that she will need to repeat this.  Her last Pap was 2015 and can be done next year.  She has started to exercise more and is walking 20 minutes which is not just flat but has inclines.  She tries to do this 2 times a week or more and she feels she is active otherwise.  She also is trying to eat well.  She is getting a tooth pulled and some cavities filled tomorrow.  We discussed holding her Coumadin for this evening.  She needs a note for the dentist to say she is okay to have this done.  She appears to be up-to-date on her immunizations.    Healthy Habits:   Regular Exercise: Yes  Sunscreen Use: Yes  Healthy Diet: working on it  Dental Visits Regularly: Yes  Seat Belt: Yes  Sexually active: Yes  Self Breast Exam Monthly:No  Hemoccults: No  Flex Sig: No  Colonoscopy: Yes  Lipid Profile: Yes  Glucose Screen: Yes  Prevention of Osteoporosis: No  Last Dexa: No  Guns at Home:  N/A      Immunization History   Administered Date(s) Administered     Hep A, historic 2004, 2005     Influenza, inj, historic 10/07/2013     Influenza, seasonal,quad inj 36+ mos 2015, 10/04/2016     Pneumo Polysac 23-V 2011     Td, historic 1970, 2000     Tdap 2010     Immunization status: up to date and documented.    No exam data present    Gynecologic History  No LMP recorded.  Contraception: none  Last Pap: . Results were: normal  Last mammogram: Yes Results were: normal    OB History    Para Term  AB Living   1 1 1      SAB TAB Ectopic Multiple Live Births             # Outcome Date GA Lbr Ricky/2nd Weight Sex Delivery Anes PTL Lv   1 Term                   Current Outpatient Prescriptions   Medication Sig Dispense Refill      clonazePAM (KLONOPIN) 1 MG tablet TAKE 1 TABLET BY MOUTH EVERY MORNING, 1/2 TABLET MIDDAY, AND 1 TABLET EVERY EVENING 75 tablet 0     dextroamphetamine-amphetamine (ADDERALL XR) 15 MG 24 hr capsule Take 1 capsule (15 mg total) by mouth every morning. Takes daily ~ 1100 30 capsule 0     dextroamphetamine-amphetamine 15 mg Tab Take 15 mg by mouth every morning.        divalproex (DEPAKOTE) 500 MG 24 hr tablet TAKE 1 TABLET BY MOUTH TWICE DAILY 60 tablet 1     escitalopram oxalate (LEXAPRO) 20 MG tablet TAKE 1 TABLET(20 MG) BY MOUTH DAILY 90 tablet 0     LORazepam (ATIVAN) 1 MG tablet TAKE ONE-HALF TABLET BY MOUTH ONCE DAILY AS NEEDED FOR PANIC ATTACKS, MAY REPEAT ONCE AFTER 20 MINUTES. 15 tablet 0     lurasidone (LATUDA) 40 mg Tab tablet Take 1 tablet (40 mg total) by mouth daily. 180 tablet 0     propranolol (INDERAL) 10 MG tablet TAKE 1 TABLET(10 MG) BY MOUTH THREE TIMES DAILY FOR MIGRAINE OR ANXIETY PREVENTION 270 tablet 0     simvastatin (ZOCOR) 20 MG tablet Take 1 tablet (20 mg total) by mouth bedtime. 30 tablet 5     terbinafine HCl (LAMISIL) 1 % cream Apply each night at bedtime to bottom of feet 30 g 2     varenicline (CHANTIX CONTINUING MONTH KENDY) 1 mg tablet Take 1 tablet (1 mg total) by mouth 2 (two) times a day. Take with full glass of water. 60 tablet 2     warfarin (COUMADIN) 2 MG tablet Take one to two tablets (2 - 4 mg) by mouth daily, or as directed. Adjust dose based on INR result. 180 tablet 0     No current facility-administered medications for this visit.      Past Medical History:   Diagnosis Date     Anxiety      Bipolar 1 disorder      Depression      DVT (deep venous thrombosis), right      Factor V Leiden      Fibromyalgia      Narcolepsy       (normal spontaneous vaginal delivery)      PE (pulmonary thromboembolism)      Past Surgical History:   Procedure Laterality Date     APPENDECTOMY       HYSTERECTOMY  2016     LUMBAR FUSION       Adhesive tape-silicones; Morphine; Percocet  "[oxycodone-acetaminophen]; and Risperidone  Family History   Problem Relation Age of Onset     Seizures Mother      Clotting disorder Father      Mental illness Sister      Stomach cancer Paternal Grandmother      Coronary artery disease Maternal Grandmother      Milind's dz--d. 42     Social History     Social History     Marital status:      Spouse name: N/A     Number of children: 1     Years of education: 16     Occupational History     disabled Not Employed     Social History Main Topics     Smoking status: Current Some Day Smoker     Packs/day: 0.25     Types: Cigarettes     Last attempt to quit: 1/28/2017     Smokeless tobacco: Never Used      Comment: 3 cigs per day     Alcohol use No     Drug use: 1.00 per week     Special: Marijuana     Sexual activity: Not Currently     Partners: Male     Other Topics Concern     Not on file     Social History Narrative       Review of Systems  Review of Systems   General ROS: positive for  - fatigue and sleep disturbance  Psychological ROS: positive for - anxiety  Ophthalmic ROS: negative  ENT ROS: positive for - nasal congestion and sore throat  Allergy and Immunology ROS: positive for - seasonal allergies  Hematological and Lymphatic ROS: negative  Endocrine ROS: negative  Breast ROS: Positive for short-lived breast pain  Respiratory ROS: positive for - cough and wheezing  Cardiovascular ROS: negative  Gastrointestinal ROS: positive for - diarrhea and heartburn  Genito-Urinary ROS: negative  Musculoskeletal ROS: positive for -neck and back pain  Neurological ROS: negative  Dermatological ROS: negative          Objective:         Vitals:    06/13/17 1414   BP: 100/50   Pulse: 72   Temp: 97.7  F (36.5  C)   Weight: 152 lb 8 oz (69.2 kg)   Height: 5' 6\" (1.676 m)     Body mass index is 24.61 kg/(m^2).    Physical  Physical Exam   General appearance - alert, well appearing, and in no distress and normal appearing weight  Mental status - normal mood, behavior, " speech, dress, motor activity, and thought processes  Eyes - pupils equal and reactive, extraocular eye movements intact, sclera anicteric  Ears - bilateral TM's and external ear canals normal  Nose - normal and patent, no erythema, discharge or polyps  Mouth - mucous membranes moist, pharynx normal without lesions and dental hygiene poor  Neck - supple, no significant adenopathy, carotids upstroke normal bilaterally, no bruits, thyroid exam: thyroid is normal in size without nodules or tenderness  Lymphatics - no palpable lymphadenopathy, no hepatosplenomegaly  Chest - positive for rales or rhonchi, symmetric air entry, productive cough  Heart - normal rate and regular rhythm, S1 and S2 normal, no murmurs noted  Abdomen - soft, nontender, nondistended, no masses or organomegaly  Breasts - breasts appear normal, no suspicious masses, no skin or nipple changes or axillary nodes  Pelvic - normal external genitalia, vulva, vagina, cervix, uterus and adnexa  Back exam - full range of motion, no tenderness, palpable spasm or pain on motion  Neurological - alert, oriented, normal speech, no focal findings or movement disorder noted, cranial nerves II through XII intact, DTR's normal and symmetric  Musculoskeletal - no joint tenderness, deformity or swelling  Extremities - peripheral pulses normal, no pedal edema, no clubbing or cyanosis  Skin - normal coloration and turgor, no rashes, no suspicious skin lesions noted

## 2021-06-11 NOTE — PROGRESS NOTES
SUBJECTIVE: Ofelia Cisneros is a 47 y.o.  female who presents today  With a complaint of three months of cough. It has been worse in the past and on 6/7 she saw Dr. Alaniz who actually gave her an antibiotic. Unfortunately the cost continues and is productive of clear phlegm. She has episodes of coughing jags which is very bothersome for her. We discussed that this is likely viral in nature and that there is a virus that is causing the symptoms and last anywhere from four weeks to four months. We discussed that perhaps steroid use would be helpful. She also wants me to look at her feet because she is having some dry flaky red and skin on the bottom of her feet and on her toes. She has been treating it with an antifungal but has not been overly consistent. She also tells me that she will be leaving soon to move back to [place]. She is excited about this. She has signed a release of information so that she can transfer care to a physician there. She is anticoagulated for a factor five Leiden mutation. She would like to get an INR check today. We discussed that the steroid might be affecting her INR and that she will need to have it rechecked when she gets there.    OBJECTIVE: /60  Pulse (!) 104  Temp 98.4  F (36.9  C)  Wt 148 lb (67.1 kg)  BMI 23.89 kg/m2  General:  Healthy appearing middle-aged female in no acute distress  Heart:  Regular rate and rhythm  Lungs:  Clear bilaterally, mildly productive cough  Abdomen:  soft  Extremities:  Warm, dry and without edema. Feet with red, flaking skin on the soles consistent with tinea pedis    ASSESSMENT & PLAN:    1. Viral bronchitis     2. Cough  methylPREDNISolone (MEDROL DOSEPACK) 4 mg tablet   3. Anticoagulant long-term use     4. Athlete's foot     5. Factor V Leiden mutation  INR      I will give her a Medrol Dosepak to see if this helps her coughing symptoms. She is to get her INR rechecked today and committed nursing will react to that. She  should get it rechecked when she is finished with her Medrol Dosepak. We discussed treatment for athlete's foot. I suggested a spray on Lamisil AT to use at least twice daily. She needs to keep the area dry. When all her symptoms are completely resolved, she should continue treatment two more weeks before she stops it. I wished her good luck on her move and new life.    Patient Active Problem List   Diagnosis     Factor V Leiden mutation     Migraine     Major depression, chronic     Fibromyalgia     SUKUMAR (generalized anxiety disorder)     Narcolepsy     Bipolar 2 disorder     Vitamin D deficiency     Controlled substance agreement signed     Anticoagulant long-term use     Panic attack     Low back pain     Nicotine abuse     neuroleptic consent discussed and signed 8/25/16     Periodic limb movement disorder     Coccyxdynia     Hyperlipidemia       Current Outpatient Prescriptions on File Prior to Visit   Medication Sig Dispense Refill     clonazePAM (KLONOPIN) 1 MG tablet TAKE 1 TABLET BY MOUTH EVERY MORNING, 1/2 TABLET MIDDAY, AND 1 TABLET EVERY EVENING 75 tablet 0     dextroamphetamine-amphetamine (ADDERALL XR) 15 MG 24 hr capsule Take 1 capsule (15 mg total) by mouth every morning. Takes daily ~ 1100 30 capsule 0     dextroamphetamine-amphetamine 15 mg Tab Take 15 mg by mouth every morning.        divalproex (DEPAKOTE) 500 MG 24 hr tablet TAKE 1 TABLET BY MOUTH TWICE DAILY 60 tablet 1     escitalopram oxalate (LEXAPRO) 20 MG tablet TAKE 1 TABLET(20 MG) BY MOUTH DAILY 90 tablet 0     LORazepam (ATIVAN) 1 MG tablet TAKE 1/2 TABLET BY MOUTH ONCE DAILY AS NEEDED FOR PANIC ATTACKS, MAY REPEAT ONCE AFTER 20 MINUTES 15 tablet 0     lurasidone (LATUDA) 40 mg Tab tablet Take 1 tablet (40 mg total) by mouth daily. 180 tablet 0     propranolol (INDERAL) 10 MG tablet TAKE 1 TABLET(10 MG) BY MOUTH THREE TIMES DAILY FOR MIGRAINE OR ANXIETY PREVENTION 270 tablet 3     simvastatin (ZOCOR) 20 MG tablet Take 1 tablet (20 mg  total) by mouth bedtime. 30 tablet 5     terbinafine HCl (LAMISIL) 1 % cream Apply each night at bedtime to bottom of feet 30 g 2     varenicline (CHANTIX CONTINUING MONTH KENDY) 1 mg tablet Take 1 tablet (1 mg total) by mouth 2 (two) times a day. Take with full glass of water. 60 tablet 2     warfarin (COUMADIN) 2 MG tablet Take 4mg (2 tabs) on all days.  OR AS DIRECTED.  Adjust dose based on INR. 180 tablet 1     No current facility-administered medications on file prior to visit.